# Patient Record
Sex: MALE | Race: WHITE
[De-identification: names, ages, dates, MRNs, and addresses within clinical notes are randomized per-mention and may not be internally consistent; named-entity substitution may affect disease eponyms.]

---

## 2017-10-12 ENCOUNTER — HOSPITAL ENCOUNTER (EMERGENCY)
Dept: HOSPITAL 45 - C.EDB | Age: 22
Discharge: HOME | End: 2017-10-12
Payer: COMMERCIAL

## 2017-10-12 VITALS — OXYGEN SATURATION: 98 % | DIASTOLIC BLOOD PRESSURE: 70 MMHG | SYSTOLIC BLOOD PRESSURE: 128 MMHG | HEART RATE: 101 BPM

## 2017-10-12 VITALS
WEIGHT: 191.8 LBS | HEIGHT: 72.01 IN | WEIGHT: 191.8 LBS | BODY MASS INDEX: 25.98 KG/M2 | HEIGHT: 72.01 IN | BODY MASS INDEX: 25.98 KG/M2

## 2017-10-12 VITALS — TEMPERATURE: 98.96 F

## 2017-10-12 DIAGNOSIS — S89.92XA: Primary | ICD-10-CM
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
## 2017-10-12 NOTE — DIAGNOSTIC IMAGING REPORT
L KNEE 3 VIEWS



CLINICAL HISTORY: Left knee pain status post trauma     



COMPARISON: None.



DISCUSSION: No fractures or dislocations are visualized. There is an equivocal

trace joint effusion.    



IMPRESSION: No fractures identified.







Electronically signed by:  Gregor Doran M.D.

10/12/2017 6:49 AM



Dictated Date/Time:  10/12/2017 6:49 AM

## 2017-10-12 NOTE — DIAGNOSTIC IMAGING REPORT
L TIBIA/FIBULA 2 VIEWS ROUTINE



CLINICAL HISTORY: Left lower leg pain status post trauma     



COMPARISON: None.



DISCUSSION: No fractures or dislocations are visualized.    



IMPRESSION: No fractures identified.







Electronically signed by:  Gregor Doran M.D.

10/12/2017 6:48 AM



Dictated Date/Time:  10/12/2017 6:48 AM
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
## 2018-03-31 ENCOUNTER — HOSPITAL ENCOUNTER (EMERGENCY)
Dept: HOSPITAL 45 - C.EDB | Age: 23
Discharge: HOME | End: 2018-03-31
Payer: SELF-PAY

## 2018-03-31 VITALS
OXYGEN SATURATION: 99 % | HEART RATE: 46 BPM | TEMPERATURE: 98.06 F | SYSTOLIC BLOOD PRESSURE: 139 MMHG | DIASTOLIC BLOOD PRESSURE: 86 MMHG

## 2018-03-31 VITALS
HEIGHT: 72.01 IN | HEIGHT: 72.01 IN | BODY MASS INDEX: 26.99 KG/M2 | BODY MASS INDEX: 26.99 KG/M2 | WEIGHT: 199.3 LBS | WEIGHT: 199.3 LBS

## 2018-03-31 DIAGNOSIS — Z88.0: ICD-10-CM

## 2018-03-31 DIAGNOSIS — K11.20: Primary | ICD-10-CM

## 2018-03-31 NOTE — EMERGENCY ROOM VISIT NOTE
History


Report prepared by Zenia:  Sage Acosta


Under the Supervision of:  Dr. Caleb Barcenas M.D.


First contact with patient:  15:06


Chief Complaint:  FACIAL PAIN/INJURY


Stated Complaint:  SWOLLEN FACE





History of Present Illness


The patient is a 22 year old male who presents to the Emergency Room with 

complaints of worsening right sided facial swelling that started last night. He 

rates his pain as a 1/10 in severity. He states his pain is worsened with 

moving his head and opening his jaw. The patient states that he noticed the 

right side of his face began to swell last night. He reports that when he woke 

up this morning he noticed his face was painful on the right side. The patient 

states that the swelling worsened and was located on the right jaw and right 

cheek. He states that he pressed his tongue against the right side of his cheek 

and felt a lump. The patient states the area is sore whenever he presses on the 

lump. He denies tooth pain, cold symptoms, malaise, fever, chills, testicular 

pain, dry mouth, a history of hypertension, and mumps exposure.  He states he 

feels fine other than the facial swelling with some minor pain.





   Source of History:  patient


   Onset:  last night


   Position:  other (right face)


   Symptom Intensity:  1/10


   Timing:  worsening


   Modifying Factors (Worsening):  other (opening jaw, moving head)


   Associated Symptoms:  No fevers, No chills


Note:


Associated symptoms: right sided facial swelling.


Denies: tooth pain, testicular pain.





Review of Systems


See HPI for pertinent positives & negatives. A total of 10 systems reviewed and 

were otherwise negative.





Past Medical & Surgical


Medical Problems:


(1) No significant past medical history








Family History





Colitis in mother





Social History


Smoking Status:  Never Smoker


Housing Status:  lives with family


Occupation Status:  employed





Current/Historical Medications


Scheduled


Clindamycin Hcl (Cleocin), 300 MG PO TID


[Workout Powder], 1 DOSE PO PRN





Allergies


Coded Allergies:  


     Penicillins (Verified  Allergy, Unknown, unknown, 10/12/17)





Physical Exam


Vital Signs











  Date Time  Temp Pulse Resp B/P (MAP) Pulse Ox O2 Delivery O2 Flow Rate FiO2


 


3/31/18 15:00 36.7 46 18 139/86 99 Room Air  











Physical Exam





Constitutional: Vital signs reviewed.


Eyes: Pupils are equal round reactive to light.  Conjunctiva are noninjected.  


ENT: Pharynx is clear without erythema or exudate.  Mucous membranes are moist.

  Neck supple without meningeal signs. Soft tissue right face swelling with 

minimal tenderness. No submandibular swelling or firmness. No elevation of the 

tongue. No dental percussion tenderness of visible caries. No cervical 

lymphadenopathy.


Respiratory: Clear to auscultation bilaterally.  Breath sounds are equal 

bilaterally. 


Cardiovascular: Regular rate and rhythm.  No rubs or gallops.


Neurological: The patient is awake and alert.  No focal deficits.


Psychiatric: Normal affect.





Medical Decision & Procedures


Medications Administered











 Medications


  (Trade)  Dose


 Ordered  Sig/Eliane


 Route  Start Time


 Stop Time Status Last Admin


Dose Admin


 


 Clindamycin HCl


  (Cleocin Cap)  300 mg  ONE  ONCE


 PO  3/31/18 15:30


 3/31/18 15:31 DC 3/31/18 15:28


300 MG











ED Course


1509: The patient was evaluated in room B02. A complete history and physical 

exam was performed.





1516: I discussed tonight's findings with the patient. He verbalized agreement 

of the treatment plan. The was discharged home.





1530: Ordered Clindamycin HCl 300 mg PO.





Medical Decision


This is a 22-year-old male presents with facial swelling.  Differential 

diagnosis includes sialoadenitis, sialolithiasis, sinusitis, periapical abscess

, mumps.  I did perform a limited focused review of portions of the patient's 

old chart on the electronic medical record. The patient has had no recent 

pertinent visits to this hospital.





I did evaluate the patient as noted above.  He appears to have swelling to the 

right parotid gland.  He does not have any signs of Asntos's angina.  He has no 

dental percussion tenderness to suggest a periapical abscess.  He has no 

symptoms consistent with mumps.  He is fully immunized.  I did recommend 

empiric treatment with antibiotics which he and his family were agreeable to.  

Should he have worsening symptoms he will return for further evaluation and 

possible CT scan.  He was given precautions regarding his antibiotics including 

the potential for C. difficile infection.  I did discharge patient with a 

prescription for clindamycin.





Medication Reconcilliation


Current Medication List:  was personally reviewed by me





Blood Pressure Screening


Patient's blood pressure:  Elevated blood pressure


Blood pressure disposition:  Elevated BP felt to be situational





Impression





 Primary Impression:  


 Sialoadenitis





Scribe Attestation


The scribe's documentation has been prepared under my direct and personally 

reviewed by me in its entirety. I confirm that the note above accurately 

reflects all work, treatment, procedures, and medical decision making performed 

by me.





Departure Information


Dispostion


Home / Self-Care





Prescriptions





Clindamycin Hcl (CLEOCIN) 150 Mg Cap


300 MG PO TID for 7 Days, #42 CAP


   Prov: Caleb Barcenas M.D.         3/31/18





Referrals


No Doctor, Assigned (PCP)





Forms


HOME CARE DOCUMENTATION FORM,                                                 

               IMPORTANT VISIT INFORMATION





Patient Instructions


ED Submandibular Gland Infec, [facility]





Additional Instructions





You have been examined and treated today on an emergency basis only. This is 

not a substitute for, or an effort to provide, complete comprehensive medical 

care. It is impossible to recognize and treat all injuries or illnesses in a 

single emergency department visit. It is therefore important that you follow up 

closely with your physician.  Call as soon as possible for an appointment.  

Return for worsening symptoms or if you develop fever, vomiting, or any other 

concerning symptoms.